# Patient Record
Sex: FEMALE
[De-identification: names, ages, dates, MRNs, and addresses within clinical notes are randomized per-mention and may not be internally consistent; named-entity substitution may affect disease eponyms.]

---

## 2017-03-15 ENCOUNTER — APPOINTMENT (OUTPATIENT)
Dept: UROLOGY | Facility: CLINIC | Age: 62
End: 2017-03-15

## 2017-03-15 VITALS
TEMPERATURE: 98.1 F | HEIGHT: 65 IN | SYSTOLIC BLOOD PRESSURE: 120 MMHG | HEART RATE: 79 BPM | DIASTOLIC BLOOD PRESSURE: 77 MMHG | WEIGHT: 148 LBS | BODY MASS INDEX: 24.66 KG/M2

## 2017-03-15 DIAGNOSIS — N81.84 PELVIC MUSCLE WASTING: ICD-10-CM

## 2017-03-15 DIAGNOSIS — Z00.00 ENCOUNTER FOR GENERAL ADULT MEDICAL EXAMINATION W/OUT ABNORMAL FINDINGS: ICD-10-CM

## 2017-03-16 ENCOUNTER — TRANSCRIPTION ENCOUNTER (OUTPATIENT)
Age: 62
End: 2017-03-16

## 2017-09-04 ENCOUNTER — TRANSCRIPTION ENCOUNTER (OUTPATIENT)
Age: 62
End: 2017-09-04

## 2021-05-18 ENCOUNTER — APPOINTMENT (OUTPATIENT)
Dept: UROLOGY | Facility: CLINIC | Age: 66
End: 2021-05-18

## 2023-03-16 ENCOUNTER — OFFICE (OUTPATIENT)
Dept: URBAN - METROPOLITAN AREA CLINIC 112 | Facility: CLINIC | Age: 68
Setting detail: OPHTHALMOLOGY
End: 2023-03-16
Payer: MEDICARE

## 2023-03-16 DIAGNOSIS — H04.122: ICD-10-CM

## 2023-03-16 DIAGNOSIS — H01.005: ICD-10-CM

## 2023-03-16 DIAGNOSIS — H25.13: ICD-10-CM

## 2023-03-16 DIAGNOSIS — H04.123: ICD-10-CM

## 2023-03-16 DIAGNOSIS — H04.121: ICD-10-CM

## 2023-03-16 DIAGNOSIS — H40.003: ICD-10-CM

## 2023-03-16 DIAGNOSIS — H01.002: ICD-10-CM

## 2023-03-16 PROCEDURE — 83861 MICROFLUID ANALY TEARS: CPT | Performed by: OPHTHALMOLOGY

## 2023-03-16 PROCEDURE — 92014 COMPRE OPH EXAM EST PT 1/>: CPT | Performed by: OPHTHALMOLOGY

## 2023-03-16 PROCEDURE — 92133 CPTRZD OPH DX IMG PST SGM ON: CPT | Performed by: OPHTHALMOLOGY

## 2023-03-16 ASSESSMENT — REFRACTION_CURRENTRX
OS_OVR_VA: 20/
OS_SPHERE: -1.50
OS_SPHERE: -2.25
OS_VPRISM_DIRECTION: PROGS
OD_SPHERE: -1.75
OS_AXIS: 140
OS_OVR_VA: 20/
OD_AXIS: 026
OD_CYLINDER: -0.50
OS_OVR_VA: 20/
OS_VPRISM_DIRECTION: PROGS
OD_VPRISM_DIRECTION: PROGS
OD_VPRISM_DIRECTION: PROGS
OD_AXIS: 22
OD_OVR_VA: 20/
OD_CYLINDER: -0.50
OS_ADD: +2.50
OD_VPRISM_DIRECTION: PROGS
OS_ADD: +1.50
OD_OVR_VA: 20/
OS_CYLINDER: -0.50
OD_SPHERE: -1.50
OD_ADD: +2.50
OD_SPHERE: -2.00
OD_OVR_VA: 20/
OS_ADD: +2.25
OD_CYLINDER: -0.50
OD_ADD: +1.50
OS_CYLINDER: -0.50
OS_AXIS: 154
OD_ADD: +2.25
OD_AXIS: 070
OS_VPRISM_DIRECTION: PROGS
OS_CYLINDER: -0.50
OS_AXIS: 152
OS_SPHERE: -2.25

## 2023-03-16 ASSESSMENT — KERATOMETRY
OS_AXISANGLE_DEGREES: 056
OD_AXISANGLE_DEGREES: 090
OD_K2POWER_DIOPTERS: 45.25
OS_K1POWER_DIOPTERS: 45.25
METHOD_AUTO_MANUAL: AUTO
OS_K2POWER_DIOPTERS: 45.50
OD_K1POWER_DIOPTERS: 45.25

## 2023-03-16 ASSESSMENT — REFRACTION_MANIFEST
OD_VA1: 20/20
OS_ADD: +2.50
OD_SPHERE: -2.25
OD_AXIS: 023
OD_AXIS: 070
OS_AXIS: 143
OS_AXIS: 140
OD_CYLINDER: -0.75
OS_SPHERE: -2.25
OS_VA1: 20/20
OS_ADD: +2.50
OU_VA: 20/20
OD_CYLINDER: -0.50
OS_AXIS: 147
OD_SPHERE: +2.25
OD_VA2: 20/20
OS_VA1: 20/20
OS_VA1: 20/20
OS_ADD: +2.50
OD_VA1: 20/20
OS_SPHERE: -1.75
OS_AXIS: 152
OS_CYLINDER: -0.50
OD_VA1: 20/20
OD_ADD: +2.50
OS_AXIS: 140
OD_AXIS: 020
OD_ADD: +2.50
OS_SPHERE: -2.25
OD_CYLINDER: -0.50
OS_SPHERE: -2.00
OD_CYLINDER: -0.50
OS_VA2: 20/20
OS_CYLINDER: -0.50
OS_CYLINDER: -0.75
OS_CYLINDER: -0.50
OS_VA1: 20/25
OD_SPHERE: -1.75
OD_ADD: +2.50
OD_CYLINDER: -0.50
OS_VA1: 20/20
OU_VA: 20/20
OD_VA1: 20/20
OD_SPHERE: -2.00
OD_VA1: 20/20
OS_AXIS: 150
OD_AXIS: 017
OD_AXIS: 025
OD_SPHERE: -1.75
OS_ADD: +2.50
OD_VA1: 20/20
OD_ADD: +2.50
OD_CYLINDER: -0.25
OD_SPHERE: -2.25
OS_CYLINDER: -0.50
OU_VA: 20/20
OD_AXIS: 022
OD_ADD: +2.50
OS_ADD: +2.50
OS_VA1: 20/25
OD_ADD: +2.50
OS_SPHERE: -1.75
OS_SPHERE: -1.50
OS_CYLINDER: -0.50
OS_ADD: +2.50

## 2023-03-16 ASSESSMENT — CONFRONTATIONAL VISUAL FIELD TEST (CVF)
OD_FINDINGS: FULL
OS_FINDINGS: FULL

## 2023-03-16 ASSESSMENT — PACHYMETRY
OS_CT_UM: 477
OD_CT_UM: 480
OS_CT_CORRECTION: 5
OD_CT_CORRECTION: 4

## 2023-03-16 ASSESSMENT — AXIALLENGTH_DERIVED
OS_AL: 23.7813
OD_AL: 23.68
OD_AL: 23.9278
OS_AL: 23.8807
OD_AL: 23.9278
OS_AL: 23.8807
OD_AL: 23.7785
OD_AL: 23.7785
OS_AL: 23.585
OD_AL: 22.2507
OS_AL: 23.6828
OD_AL: 23.8281
OS_AL: 23.7319
OS_AL: 23.7319

## 2023-03-16 ASSESSMENT — SPHEQUIV_DERIVED
OD_SPHEQUIV: -2.5
OS_SPHEQUIV: -2.5
OS_SPHEQUIV: -2.125
OS_SPHEQUIV: -2.5
OD_SPHEQUIV: -2.125
OD_SPHEQUIV: -2.25
OS_SPHEQUIV: -2.25
OS_SPHEQUIV: -2
OS_SPHEQUIV: -2.125
OD_SPHEQUIV: -1.875
OD_SPHEQUIV: -2.5
OS_SPHEQUIV: -1.75
OD_SPHEQUIV: -2.125
OD_SPHEQUIV: 2

## 2023-03-16 ASSESSMENT — LID EXAM ASSESSMENTS
OD_BLEPHARITIS: RLL T
OS_TRICHIASIS: ABSENT
OS_BLEPHARITIS: LLL T
OD_COMMENTS: WNL

## 2023-03-16 ASSESSMENT — SUPERFICIAL PUNCTATE KERATITIS (SPK)
OS_SPK: T
OD_SPK: T

## 2023-03-16 ASSESSMENT — REFRACTION_AUTOREFRACTION
OD_CYLINDER: -0.75
OS_SPHERE: -1.75
OD_SPHERE: -1.75
OD_AXIS: 051
OS_AXIS: 137
OS_CYLINDER: -0.75

## 2023-03-16 ASSESSMENT — TONOMETRY
OD_IOP_MMHG: 12
OS_IOP_MMHG: 12

## 2023-03-16 ASSESSMENT — VISUAL ACUITY
OD_BCVA: 20/25
OS_BCVA: 20/20

## 2023-09-13 ENCOUNTER — OFFICE (OUTPATIENT)
Dept: URBAN - METROPOLITAN AREA CLINIC 112 | Facility: CLINIC | Age: 68
Setting detail: OPHTHALMOLOGY
End: 2023-09-13
Payer: MEDICARE

## 2023-09-13 DIAGNOSIS — H01.002: ICD-10-CM

## 2023-09-13 DIAGNOSIS — H04.122: ICD-10-CM

## 2023-09-13 DIAGNOSIS — H25.13: ICD-10-CM

## 2023-09-13 DIAGNOSIS — H01.005: ICD-10-CM

## 2023-09-13 DIAGNOSIS — H40.003: ICD-10-CM

## 2023-09-13 DIAGNOSIS — H04.123: ICD-10-CM

## 2023-09-13 DIAGNOSIS — H04.121: ICD-10-CM

## 2023-09-13 PROCEDURE — 92083 EXTENDED VISUAL FIELD XM: CPT | Performed by: OPHTHALMOLOGY

## 2023-09-13 PROCEDURE — 92014 COMPRE OPH EXAM EST PT 1/>: CPT | Performed by: OPHTHALMOLOGY

## 2023-09-13 PROCEDURE — 92250 FUNDUS PHOTOGRAPHY W/I&R: CPT | Performed by: OPHTHALMOLOGY

## 2023-09-13 ASSESSMENT — REFRACTION_CURRENTRX
OS_ADD: +1.50
OD_CYLINDER: -0.50
OD_OVR_VA: 20/
OD_VPRISM_DIRECTION: PROGS
OS_VPRISM_DIRECTION: PROGS
OS_AXIS: 140
OD_SPHERE: -1.75
OD_AXIS: 026
OS_ADD: +2.25
OS_CYLINDER: -0.50
OD_OVR_VA: 20/
OD_VPRISM_DIRECTION: PROGS
OS_VPRISM_DIRECTION: PROGS
OD_VPRISM_DIRECTION: PROGS
OD_ADD: +2.25
OS_OVR_VA: 20/
OS_AXIS: 154
OD_SPHERE: -2.00
OS_OVR_VA: 20/
OS_CYLINDER: -0.50
OS_SPHERE: -2.25
OS_SPHERE: -1.50
OD_ADD: +1.50
OS_CYLINDER: -0.50
OD_AXIS: 070
OS_VPRISM_DIRECTION: PROGS
OS_ADD: +2.50
OS_AXIS: 152
OD_CYLINDER: -0.50
OD_CYLINDER: -0.50
OD_ADD: +2.50
OD_OVR_VA: 20/
OS_OVR_VA: 20/
OD_AXIS: 22
OD_SPHERE: -1.50
OS_SPHERE: -2.25

## 2023-09-13 ASSESSMENT — REFRACTION_MANIFEST
OD_AXIS: 017
OS_AXIS: 152
OD_ADD: +2.50
OD_VA1: 20/20
OS_ADD: +2.50
OS_SPHERE: -2.25
OS_SPHERE: -1.50
OU_VA: 20/20
OS_VA1: 20/20
OS_VA1: 20/20
OD_SPHERE: -1.75
OS_ADD: +2.50
OD_CYLINDER: -0.50
OD_VA1: 20/20
OS_CYLINDER: -0.50
OD_CYLINDER: -0.75
OD_AXIS: 070
OD_SPHERE: -1.75
OS_CYLINDER: -0.50
OD_VA1: 20/20
OS_AXIS: 140
OS_ADD: +2.50
OS_VA1: 20/20
OD_AXIS: 022
OD_SPHERE: -2.25
OS_CYLINDER: -0.50
OD_SPHERE: -2.25
OD_ADD: +2.50
OS_AXIS: 140
OD_SPHERE: +2.25
OD_ADD: +2.50
OS_AXIS: 147
OS_CYLINDER: -0.50
OD_ADD: +2.50
OS_VA1: 20/25
OS_VA1: 20/25
OS_AXIS: 150
OS_CYLINDER: -0.50
OU_VA: 20/20
OS_ADD: +2.50
OU_VA: 20/20
OS_VA2: 20/20
OS_ADD: +2.50
OD_SPHERE: -2.00
OS_SPHERE: -1.75
OD_VA1: 20/20
OS_VA1: 20/20
OD_AXIS: 025
OD_CYLINDER: -0.50
OD_AXIS: 020
OD_VA1: 20/20
OD_VA2: 20/20
OS_ADD: +2.50
OS_AXIS: 143
OS_SPHERE: -1.75
OD_ADD: +2.50
OD_CYLINDER: -0.50
OS_CYLINDER: -0.75
OS_SPHERE: -2.25
OD_CYLINDER: -0.25
OD_CYLINDER: -0.50
OD_VA1: 20/20
OS_SPHERE: -2.00
OD_ADD: +2.50
OD_AXIS: 023

## 2023-09-13 ASSESSMENT — SPHEQUIV_DERIVED
OD_SPHEQUIV: -2.5
OD_SPHEQUIV: -2.25
OS_SPHEQUIV: -2.5
OD_SPHEQUIV: -1.875
OS_SPHEQUIV: -1.75
OS_SPHEQUIV: -1.875
OD_SPHEQUIV: -2.125
OD_SPHEQUIV: -2.5
OD_SPHEQUIV: -2.25
OS_SPHEQUIV: -2.25
OS_SPHEQUIV: -2
OS_SPHEQUIV: -2.5
OS_SPHEQUIV: -2.125
OD_SPHEQUIV: 2

## 2023-09-13 ASSESSMENT — KERATOMETRY
METHOD_AUTO_MANUAL: AUTO
OD_AXISANGLE_DEGREES: 090
OS_AXISANGLE_DEGREES: 056
OS_K2POWER_DIOPTERS: 45.50
OS_K1POWER_DIOPTERS: 45.25
OD_K2POWER_DIOPTERS: 45.25
OD_K1POWER_DIOPTERS: 45.25

## 2023-09-13 ASSESSMENT — TONOMETRY
OS_IOP_MMHG: 13
OD_IOP_MMHG: 16

## 2023-09-13 ASSESSMENT — LID EXAM ASSESSMENTS
OS_BLEPHARITIS: LLL T
OD_BLEPHARITIS: RLL T
OS_TRICHIASIS: ABSENT
OD_COMMENTS: WNL

## 2023-09-13 ASSESSMENT — REFRACTION_AUTOREFRACTION
OD_CYLINDER: -0.50
OS_SPHERE: -1.75
OD_SPHERE: -2.00
OS_AXIS: 140
OS_CYLINDER: -0.25
OD_AXIS: 051

## 2023-09-13 ASSESSMENT — CONFRONTATIONAL VISUAL FIELD TEST (CVF)
OS_FINDINGS: FULL
OD_FINDINGS: FULL

## 2023-09-13 ASSESSMENT — SUPERFICIAL PUNCTATE KERATITIS (SPK)
OS_SPK: T
OD_SPK: T

## 2023-09-13 ASSESSMENT — PACHYMETRY
OD_CT_UM: 480
OS_CT_CORRECTION: 5
OD_CT_CORRECTION: 4
OS_CT_UM: 477

## 2023-09-13 ASSESSMENT — VISUAL ACUITY
OS_BCVA: 20/20
OD_BCVA: 20/25+1

## 2023-09-13 ASSESSMENT — AXIALLENGTH_DERIVED
OD_AL: 23.7785
OD_AL: 23.9278
OS_AL: 23.7319
OD_AL: 23.68
OS_AL: 23.6828
OS_AL: 23.8807
OS_AL: 23.8807
OD_AL: 23.8281
OD_AL: 23.8281
OS_AL: 23.585
OD_AL: 22.2507
OS_AL: 23.6338
OD_AL: 23.9278
OS_AL: 23.7813

## 2023-11-08 ENCOUNTER — OFFICE (OUTPATIENT)
Dept: URBAN - METROPOLITAN AREA CLINIC 116 | Facility: CLINIC | Age: 68
Setting detail: OPHTHALMOLOGY
End: 2023-11-08
Payer: MEDICARE

## 2023-11-08 DIAGNOSIS — H02.825: ICD-10-CM

## 2023-11-08 PROCEDURE — 92014 COMPRE OPH EXAM EST PT 1/>: CPT | Performed by: OPTOMETRIST

## 2023-11-08 ASSESSMENT — REFRACTION_CURRENTRX
OS_ADD: +1.50
OS_AXIS: 145
OS_OVR_VA: 20/
OS_CYLINDER: -0.50
OD_OVR_VA: 20/
OS_OVR_VA: 20/
OD_ADD: +2.50
OS_SPHERE: -2.25
OD_CYLINDER: -0.50
OD_AXIS: 055
OS_VPRISM_DIRECTION: PROGS
OS_ADD: +2.25
OD_OVR_VA: 20/
OD_CYLINDER: -0.50
OS_VPRISM_DIRECTION: PROGS
OD_SPHERE: -2.00
OD_SPHERE: -1.75
OS_AXIS: 152
OD_OVR_VA: 20/
OD_SPHERE: -1.50
OD_VPRISM_DIRECTION: PROGS
OD_AXIS: 026
OS_SPHERE: -1.50
OS_AXIS: 154
OD_AXIS: 22
OS_CYLINDER: -0.50
OS_ADD: +2.50
OD_ADD: +2.25
OD_ADD: +1.50
OD_AXIS: 070
OD_VPRISM_DIRECTION: PROGS
OD_ADD: +2.25
OS_AXIS: 140
OS_ADD: +2.25
OD_CYLINDER: -0.50
OS_CYLINDER: -0.50
OS_OVR_VA: 20/
OS_VPRISM_DIRECTION: PROGS
OS_CYLINDER: -0.50
OD_CYLINDER: -0.25
OS_SPHERE: -2.25
OD_SPHERE: -1.75
OD_VPRISM_DIRECTION: PROGS
OS_SPHERE: -1.50

## 2023-11-08 ASSESSMENT — REFRACTION_MANIFEST
OD_AXIS: 022
OD_ADD: +2.50
OD_CYLINDER: -0.25
OD_AXIS: 025
OD_SPHERE: +2.25
OD_CYLINDER: -0.50
OS_VA1: 20/25
OS_CYLINDER: -0.50
OS_ADD: +2.50
OS_CYLINDER: -0.50
OD_SPHERE: -1.75
OS_SPHERE: -1.75
OD_VA1: 20/20
OD_VA2: 20/20
OS_ADD: +2.50
OS_ADD: +2.50
OS_VA1: 20/20
OD_ADD: +2.50
OS_VA1: 20/20
OD_VA1: 20/20
OS_AXIS: 150
OU_VA: 20/20
OD_ADD: +2.25
OD_AXIS: 017
OD_SPHERE: -1.75
OS_VA1: 20/20
OU_VA: 20/20
OS_CYLINDER: -0.50
OD_CYLINDER: -0.50
OD_VA1: 20/20
OS_VA1: 20/20
OU_VA: 20/20
OD_AXIS: 023
OS_ADD: +2.50
OD_AXIS: 075
OS_CYLINDER: -0.75
OD_VA1: 20/20
OD_AXIS: 020
OS_ADD: +2.25
OS_SPHERE: -1.75
OD_CYLINDER: -0.50
OS_AXIS: 147
OD_VA1: 20/20
OU_VA: 20/20
OD_CYLINDER: -0.75
OD_VA1: 20/20
OS_SPHERE: -1.75
OD_ADD: +2.50
OD_SPHERE: -1.75
OS_SPHERE: -1.50
OD_ADD: +2.50
OS_AXIS: 143
OS_VA1: 20/20
OD_ADD: +2.50
OD_SPHERE: -2.25
OD_ADD: +2.50
OS_CYLINDER: -0.50
OD_SPHERE: -2.00
OS_CYLINDER: -0.50
OD_CYLINDER: -0.50
OS_ADD: +2.50
OS_AXIS: 120
OD_SPHERE: -2.25
OS_SPHERE: -2.00
OD_AXIS: 070
OS_SPHERE: -2.25
OS_AXIS: 140
OS_AXIS: 152
OS_AXIS: 140
OS_VA2: 20/20
OS_CYLINDER: -0.25
OS_ADD: +2.50
OS_VA1: 20/25
OD_CYLINDER: -0.50
OS_SPHERE: -2.25
OD_VA1: 20/20

## 2023-11-08 ASSESSMENT — SPHEQUIV_DERIVED
OD_SPHEQUIV: -2.5
OD_SPHEQUIV: -2
OD_SPHEQUIV: -2.5
OD_SPHEQUIV: -2.25
OS_SPHEQUIV: -1.75
OS_SPHEQUIV: -2.25
OD_SPHEQUIV: -2
OS_SPHEQUIV: -1.875
OS_SPHEQUIV: -2.125
OS_SPHEQUIV: -2
OD_SPHEQUIV: 2
OD_SPHEQUIV: -2.125
OS_SPHEQUIV: -2.125
OS_SPHEQUIV: -2.5
OD_SPHEQUIV: -1.875
OS_SPHEQUIV: -2.5

## 2023-11-08 ASSESSMENT — REFRACTION_AUTOREFRACTION
OS_AXIS: 120
OD_CYLINDER: -0.50
OS_SPHERE: -2.00
OS_CYLINDER: -0.25
OD_AXIS: 085
OD_SPHERE: -1.75

## 2023-11-08 ASSESSMENT — CONFRONTATIONAL VISUAL FIELD TEST (CVF)
OD_FINDINGS: FULL
OS_FINDINGS: FULL

## 2023-11-08 ASSESSMENT — SUPERFICIAL PUNCTATE KERATITIS (SPK)
OD_SPK: T
OS_SPK: T

## 2023-11-08 ASSESSMENT — LID EXAM ASSESSMENTS
OS_TRICHIASIS: ABSENT
OS_BLEPHARITIS: LLL T
OD_BLEPHARITIS: RLL T
OD_COMMENTS: WNL

## 2024-09-09 ENCOUNTER — APPOINTMENT (OUTPATIENT)
Dept: UROGYNECOLOGY | Facility: CLINIC | Age: 69
End: 2024-09-09
Payer: MEDICARE

## 2024-09-09 VITALS
DIASTOLIC BLOOD PRESSURE: 80 MMHG | SYSTOLIC BLOOD PRESSURE: 134 MMHG | WEIGHT: 155 LBS | BODY MASS INDEX: 25.83 KG/M2 | HEIGHT: 65 IN

## 2024-09-09 DIAGNOSIS — R35.0 FREQUENCY OF MICTURITION: ICD-10-CM

## 2024-09-09 LAB
BILIRUB UR QL STRIP: NEGATIVE
CLARITY UR: CLEAR
COLLECTION METHOD: NORMAL
GLUCOSE UR-MCNC: NEGATIVE
HCG UR QL: 1 EU/DL
HGB UR QL STRIP.AUTO: NEGATIVE
KETONES UR-MCNC: NEGATIVE
LEUKOCYTE ESTERASE UR QL STRIP: NEGATIVE
NITRITE UR QL STRIP: NEGATIVE
PH UR STRIP: 7.5
PROT UR STRIP-MCNC: NEGATIVE
SP GR UR STRIP: 1.02

## 2024-09-09 PROCEDURE — 81003 URINALYSIS AUTO W/O SCOPE: CPT | Mod: QW

## 2024-09-09 PROCEDURE — 99459 PELVIC EXAMINATION: CPT

## 2024-09-09 PROCEDURE — 51798 US URINE CAPACITY MEASURE: CPT

## 2024-09-09 PROCEDURE — 99204 OFFICE O/P NEW MOD 45 MIN: CPT
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
## 2024-09-09 NOTE — DISCUSSION/SUMMARY
[FreeTextEntry1] : MING is a 68 year female who presents for PFD. On exam, negative CST, normal PVR, no POP. Doing well with current regimen.   Based on her exams and symptoms we discussed pelvic floor dysfunction with significant levator spasm. We discussed management options including relaxation techniques including stress reduction, avoiding pushing and straining, aveeno oatmeal baths 15 minutes twice daily, and management of constipation. We discussed the importance of physical therapy. We discussed risks and benefits of vaginal valium, she is aware she needs to go to a compounding pharmacy.   [] Continue PF PT referral given [] Continue valium suppositories PRN [] Continue Estradiol cream  [] Pelvic sono ordered   f/u Jenny Samayoa All questions answered.

## 2024-09-09 NOTE — ADDENDUM
[FreeTextEntry1] : This note was written by Nenita Gary, acting as the  for Dr. Lazcano. This note accurately reflects the work and decisions made by Dr. Lazcano.

## 2024-09-09 NOTE — PHYSICAL EXAM
[Chaperone Present] : A chaperone was present in the examining room during all aspects of the physical examination [70057] : A chaperone was present during the pelvic exam. [No Acute Distress] : in no acute distress [Well developed] : well developed [Well Nourished] : ~L well nourished [Oriented x3] : oriented to person, place, and time [No Edema] : ~T edema was not present [Warm and Dry] : was warm and dry to touch [Vulvar Atrophy] : vulvar atrophy [Labia Majora] : were normal [Labia Minora] : were normal [Normal Appearance] : general appearance was normal [Atrophy] : atrophy [Normal] : normal [Soft] :  the cervix was soft [Uterine Adnexae] : were not tender and not enlarged [FreeTextEntry2] :  Nenita Gary [Cough] : no cough [Tenderness] : ~T no ~M abdominal tenderness observed [Distended] : not distended [FreeTextEntry3] : neg CST [de-identified] : no POP

## 2024-09-09 NOTE — PHYSICAL EXAM
[Chaperone Present] : A chaperone was present in the examining room during all aspects of the physical examination [28615] : A chaperone was present during the pelvic exam. [No Acute Distress] : in no acute distress [Well developed] : well developed [Well Nourished] : ~L well nourished [Oriented x3] : oriented to person, place, and time [No Edema] : ~T edema was not present [Warm and Dry] : was warm and dry to touch [Vulvar Atrophy] : vulvar atrophy [Labia Majora] : were normal [Labia Minora] : were normal [Normal Appearance] : general appearance was normal [Atrophy] : atrophy [Normal] : normal [Soft] :  the cervix was soft [Uterine Adnexae] : were not tender and not enlarged [FreeTextEntry2] :  Nenita Gary [Cough] : no cough [Tenderness] : ~T no ~M abdominal tenderness observed [Distended] : not distended [FreeTextEntry3] : neg CST [de-identified] : no POP

## 2024-09-09 NOTE — REASON FOR VISIT
[Initial Visit ___] : an initial visit for [unfilled] [Questionnaire Received] : Patient questionnaire received [Intake Form Reviewed] : Patient intake form with past medical history, surgical history, family history and social history reviewed today [Pelvic Organ Prolapse] : pelvic organ prolapse [Pelvic Pain] : pelvic pain

## 2024-09-09 NOTE — PROCEDURE
[Straight Catheterization] : insertion of a straight catheter [Urinary Frequency] : urinary frequency [Patient] : the patient [___ Fr Straight Tip] : a [unfilled] in [place] straight tip catheter [None] : there were no complications with the catheter insertion [Clear] : clear [No Complications] : no complications [Tolerated Well] : the patient tolerated the procedure well [Post procedure instructions and information given] : Post procedure instructions and information were given and reviewed with patient. [0] : 0

## 2024-09-09 NOTE — OB HISTORY
[Vaginal ___] : [unfilled] vaginal delivery(s) [Definite ___ (Date)] : the last menstrual period was [unfilled] [Taking Estrogens] : taking estrogen replacement [Abnormal Pap Smear] : normal pap smear [Sexually Active] : is not sexually active [FreeTextEntry1] : Largest baby 7lbs 7oz

## 2024-09-09 NOTE — PROCEDURE
[Straight Catheterization] : insertion of a straight catheter [Urinary Frequency] : urinary frequency [Patient] : the patient [___ Fr Straight Tip] : a [unfilled] in [place] straight tip catheter [None] : there were no complications with the catheter insertion [Clear] : clear [No Complications] : no complications [Tolerated Well] : the patient tolerated the procedure well [Post procedure instructions and information given] : Post procedure instructions and information were given and reviewed with patient. [0] : 0

## 2024-09-12 PROBLEM — R35.0 FREQUENT URINATION: Status: ACTIVE | Noted: 2024-09-12

## 2024-09-12 RX ORDER — DIAZEPAM 2 MG/1
2 TABLET ORAL
Refills: 0 | Status: ACTIVE | COMMUNITY

## 2024-09-12 RX ORDER — ESTRADIOL 1 MG/1
1 TABLET ORAL
Refills: 0 | Status: ACTIVE | COMMUNITY

## 2024-09-23 ENCOUNTER — NON-APPOINTMENT (OUTPATIENT)
Age: 69
End: 2024-09-23

## 2024-09-23 ENCOUNTER — OUTPATIENT (OUTPATIENT)
Dept: OUTPATIENT SERVICES | Facility: HOSPITAL | Age: 69
LOS: 1 days | End: 2024-09-23
Payer: MEDICARE

## 2024-09-23 ENCOUNTER — APPOINTMENT (OUTPATIENT)
Dept: ULTRASOUND IMAGING | Facility: CLINIC | Age: 69
End: 2024-09-23
Payer: MEDICARE

## 2024-09-23 DIAGNOSIS — M62.89 OTHER SPECIFIED DISORDERS OF MUSCLE: ICD-10-CM

## 2024-09-23 DIAGNOSIS — R39.15 URGENCY OF URINATION: ICD-10-CM

## 2024-09-23 DIAGNOSIS — R39.89 OTHER SYMPTOMS AND SIGNS INVOLVING THE GENITOURINARY SYSTEM: ICD-10-CM

## 2024-09-23 PROCEDURE — 76830 TRANSVAGINAL US NON-OB: CPT

## 2024-09-23 PROCEDURE — 76856 US EXAM PELVIC COMPLETE: CPT | Mod: 26

## 2024-09-23 PROCEDURE — 76856 US EXAM PELVIC COMPLETE: CPT

## 2024-09-23 PROCEDURE — 76830 TRANSVAGINAL US NON-OB: CPT | Mod: 26

## 2024-09-25 ENCOUNTER — OFFICE (OUTPATIENT)
Dept: URBAN - METROPOLITAN AREA CLINIC 112 | Facility: CLINIC | Age: 69
Setting detail: OPHTHALMOLOGY
End: 2024-09-25
Payer: MEDICARE

## 2024-09-25 DIAGNOSIS — H04.121: ICD-10-CM

## 2024-09-25 DIAGNOSIS — H01.005: ICD-10-CM

## 2024-09-25 DIAGNOSIS — H40.003: ICD-10-CM

## 2024-09-25 DIAGNOSIS — H01.002: ICD-10-CM

## 2024-09-25 DIAGNOSIS — H04.122: ICD-10-CM

## 2024-09-25 DIAGNOSIS — H25.13: ICD-10-CM

## 2024-09-25 DIAGNOSIS — H04.123: ICD-10-CM

## 2024-09-25 PROCEDURE — 92250 FUNDUS PHOTOGRAPHY W/I&R: CPT | Performed by: OPHTHALMOLOGY

## 2024-09-25 PROCEDURE — 92083 EXTENDED VISUAL FIELD XM: CPT | Performed by: OPHTHALMOLOGY

## 2024-09-25 PROCEDURE — 83861 MICROFLUID ANALY TEARS: CPT | Mod: QW,LT | Performed by: OPHTHALMOLOGY

## 2024-09-25 PROCEDURE — 83861 MICROFLUID ANALY TEARS: CPT | Mod: QW,RT | Performed by: OPHTHALMOLOGY

## 2024-09-25 PROCEDURE — 92014 COMPRE OPH EXAM EST PT 1/>: CPT | Performed by: OPHTHALMOLOGY

## 2024-09-25 ASSESSMENT — CONFRONTATIONAL VISUAL FIELD TEST (CVF)
OD_FINDINGS: FULL
OS_FINDINGS: FULL

## 2024-09-25 ASSESSMENT — LID EXAM ASSESSMENTS
OS_TRICHIASIS: ABSENT
OS_BLEPHARITIS: LLL T
OD_COMMENTS: WNL
OD_BLEPHARITIS: RLL T

## 2024-09-26 RX ORDER — DIAZEPAM 5 MG/1
5 TABLET ORAL
Qty: 30 | Refills: 0 | Status: ACTIVE | COMMUNITY
Start: 2024-09-26 | End: 1900-01-01

## 2024-09-27 RX ORDER — DIAZEPAM 5 MG/1
5 TABLET ORAL
Qty: 60 | Refills: 0 | Status: ACTIVE | COMMUNITY
Start: 2024-09-27 | End: 1900-01-01

## 2024-11-20 ENCOUNTER — LABORATORY RESULT (OUTPATIENT)
Age: 69
End: 2024-11-20

## 2024-11-20 ENCOUNTER — APPOINTMENT (OUTPATIENT)
Dept: UROGYNECOLOGY | Facility: CLINIC | Age: 69
End: 2024-11-20
Payer: MEDICARE

## 2024-11-20 VITALS
BODY MASS INDEX: 25.83 KG/M2 | OXYGEN SATURATION: 98 % | TEMPERATURE: 98.2 F | HEART RATE: 62 BPM | WEIGHT: 155 LBS | SYSTOLIC BLOOD PRESSURE: 121 MMHG | DIASTOLIC BLOOD PRESSURE: 74 MMHG | HEIGHT: 65 IN

## 2024-11-20 DIAGNOSIS — M62.89 OTHER SPECIFIED DISORDERS OF MUSCLE: ICD-10-CM

## 2024-11-20 DIAGNOSIS — R39.89 OTHER SYMPTOMS AND SIGNS INVOLVING THE GENITOURINARY SYSTEM: ICD-10-CM

## 2024-11-20 DIAGNOSIS — R35.0 FREQUENCY OF MICTURITION: ICD-10-CM

## 2024-11-20 DIAGNOSIS — M62.838 OTHER MUSCLE SPASM: ICD-10-CM

## 2024-11-20 DIAGNOSIS — R39.15 URGENCY OF URINATION: ICD-10-CM

## 2024-11-20 PROCEDURE — 99215 OFFICE O/P EST HI 40 MIN: CPT

## 2024-11-20 PROCEDURE — 51798 US URINE CAPACITY MEASURE: CPT

## 2024-11-20 PROCEDURE — 99459 PELVIC EXAMINATION: CPT

## 2024-11-20 RX ORDER — METHENAMINE, SODIUM PHOSPHATE, MONOBASIC, ANHYDROUS, PHENYL SALICYLATE, METHYLENE BLUE AND HYOSCYAMINE SULFATE 118; 40.8; 36; 10; .12 MG/1; MG/1; MG/1; MG/1; MG/1
118 CAPSULE ORAL
Qty: 60 | Refills: 2 | Status: ACTIVE | COMMUNITY
Start: 2024-11-20 | End: 1900-01-01

## 2024-11-20 RX ORDER — BACLOFEN 20 MG/1
20 TABLET ORAL
Qty: 60 | Refills: 0 | Status: ACTIVE | COMMUNITY
Start: 2024-11-20 | End: 1900-01-01

## 2024-11-21 ENCOUNTER — NON-APPOINTMENT (OUTPATIENT)
Age: 69
End: 2024-11-21

## 2024-11-21 LAB
APPEARANCE: CLEAR
BILIRUBIN URINE: NEGATIVE
BLOOD URINE: NEGATIVE
COLOR: YELLOW
GLUCOSE QUALITATIVE U: NEGATIVE MG/DL
KETONES URINE: NEGATIVE MG/DL
LEUKOCYTE ESTERASE URINE: ABNORMAL
NITRITE URINE: NEGATIVE
PH URINE: 7.5
PROTEIN URINE: NEGATIVE MG/DL
SPECIFIC GRAVITY URINE: 1.01
UROBILINOGEN URINE: 0.2 MG/DL

## 2025-01-10 ENCOUNTER — APPOINTMENT (OUTPATIENT)
Dept: UROGYNECOLOGY | Facility: CLINIC | Age: 70
End: 2025-01-10
Payer: MEDICARE

## 2025-01-10 VITALS — HEART RATE: 84 BPM | TEMPERATURE: 98.1 F | SYSTOLIC BLOOD PRESSURE: 121 MMHG | DIASTOLIC BLOOD PRESSURE: 78 MMHG

## 2025-01-10 DIAGNOSIS — M62.89 OTHER SPECIFIED DISORDERS OF MUSCLE: ICD-10-CM

## 2025-01-10 DIAGNOSIS — R35.0 FREQUENCY OF MICTURITION: ICD-10-CM

## 2025-01-10 DIAGNOSIS — R39.89 OTHER SYMPTOMS AND SIGNS INVOLVING THE GENITOURINARY SYSTEM: ICD-10-CM

## 2025-01-10 PROCEDURE — 99213 OFFICE O/P EST LOW 20 MIN: CPT

## 2025-02-28 ENCOUNTER — APPOINTMENT (OUTPATIENT)
Dept: CARDIOLOGY | Facility: CLINIC | Age: 70
End: 2025-02-28

## 2025-03-26 ENCOUNTER — APPOINTMENT (OUTPATIENT)
Dept: UROGYNECOLOGY | Facility: CLINIC | Age: 70
End: 2025-03-26
Payer: MEDICARE

## 2025-03-26 ENCOUNTER — LABORATORY RESULT (OUTPATIENT)
Age: 70
End: 2025-03-26

## 2025-03-26 VITALS
HEART RATE: 65 BPM | HEIGHT: 66 IN | TEMPERATURE: 97.5 F | RESPIRATION RATE: 16 BRPM | DIASTOLIC BLOOD PRESSURE: 85 MMHG | WEIGHT: 150 LBS | SYSTOLIC BLOOD PRESSURE: 143 MMHG | BODY MASS INDEX: 24.11 KG/M2 | OXYGEN SATURATION: 99 %

## 2025-03-26 DIAGNOSIS — R39.89 OTHER SYMPTOMS AND SIGNS INVOLVING THE GENITOURINARY SYSTEM: ICD-10-CM

## 2025-03-26 DIAGNOSIS — N95.8 OTHER SPECIFIED MENOPAUSAL AND PERIMENOPAUSAL DISORDERS: ICD-10-CM

## 2025-03-26 DIAGNOSIS — R39.15 URGENCY OF URINATION: ICD-10-CM

## 2025-03-26 DIAGNOSIS — M62.89 OTHER SPECIFIED DISORDERS OF MUSCLE: ICD-10-CM

## 2025-03-26 DIAGNOSIS — R35.0 FREQUENCY OF MICTURITION: ICD-10-CM

## 2025-03-26 PROCEDURE — 99214 OFFICE O/P EST MOD 30 MIN: CPT

## 2025-03-26 PROCEDURE — 51798 US URINE CAPACITY MEASURE: CPT

## 2025-03-26 RX ORDER — ESTRADIOL 0.1 MG/G
0.1 CREAM VAGINAL
Qty: 1 | Refills: 0 | Status: ACTIVE | COMMUNITY
Start: 2025-03-26 | End: 1900-01-01

## 2025-04-01 ENCOUNTER — NON-APPOINTMENT (OUTPATIENT)
Age: 70
End: 2025-04-01

## 2025-05-23 ENCOUNTER — APPOINTMENT (OUTPATIENT)
Dept: CARDIOLOGY | Facility: CLINIC | Age: 70
End: 2025-05-23

## 2025-05-23 ENCOUNTER — NON-APPOINTMENT (OUTPATIENT)
Age: 70
End: 2025-05-23

## 2025-05-23 VITALS
HEART RATE: 80 BPM | BODY MASS INDEX: 25.55 KG/M2 | SYSTOLIC BLOOD PRESSURE: 144 MMHG | WEIGHT: 159 LBS | OXYGEN SATURATION: 99 % | HEIGHT: 66 IN | DIASTOLIC BLOOD PRESSURE: 82 MMHG

## 2025-05-23 VITALS — SYSTOLIC BLOOD PRESSURE: 136 MMHG | DIASTOLIC BLOOD PRESSURE: 86 MMHG

## 2025-05-23 DIAGNOSIS — Z82.49 FAMILY HISTORY OF ISCHEMIC HEART DISEASE AND OTHER DISEASES OF THE CIRCULATORY SYSTEM: ICD-10-CM

## 2025-05-23 DIAGNOSIS — Z80.9 FAMILY HISTORY OF MALIGNANT NEOPLASM, UNSPECIFIED: ICD-10-CM

## 2025-05-23 PROCEDURE — 99204 OFFICE O/P NEW MOD 45 MIN: CPT

## 2025-05-23 PROCEDURE — 93000 ELECTROCARDIOGRAM COMPLETE: CPT

## 2025-05-27 LAB
APPEARANCE: CLEAR
BACTERIA UR CULT: NORMAL
BACTERIA: NEGATIVE /HPF
BILIRUBIN URINE: NEGATIVE
BLOOD URINE: NEGATIVE
CAST: 0 /LPF
COLOR: ABNORMAL
EPITHELIAL CELLS: 3 /HPF
GLUCOSE QUALITATIVE U: NEGATIVE MG/DL
KETONES URINE: NEGATIVE MG/DL
LEUKOCYTE ESTERASE URINE: ABNORMAL
MICROSCOPIC-UA: NORMAL
NITRITE URINE: NEGATIVE
PH URINE: 6.5
PROTEIN URINE: NEGATIVE MG/DL
RED BLOOD CELLS URINE: 0 /HPF
SPECIFIC GRAVITY URINE: 1.01
UROBILINOGEN URINE: 0.2 MG/DL
WHITE BLOOD CELLS URINE: 2 /HPF

## 2025-05-28 ENCOUNTER — APPOINTMENT (OUTPATIENT)
Dept: UROGYNECOLOGY | Facility: CLINIC | Age: 70
End: 2025-05-28
Payer: MEDICARE

## 2025-05-28 ENCOUNTER — NON-APPOINTMENT (OUTPATIENT)
Age: 70
End: 2025-05-28

## 2025-05-28 VITALS — SYSTOLIC BLOOD PRESSURE: 128 MMHG | TEMPERATURE: 98 F | DIASTOLIC BLOOD PRESSURE: 80 MMHG | HEART RATE: 73 BPM

## 2025-05-28 DIAGNOSIS — R39.15 URGENCY OF URINATION: ICD-10-CM

## 2025-05-28 DIAGNOSIS — M62.89 OTHER SPECIFIED DISORDERS OF MUSCLE: ICD-10-CM

## 2025-05-28 DIAGNOSIS — N95.8 OTHER SPECIFIED MENOPAUSAL AND PERIMENOPAUSAL DISORDERS: ICD-10-CM

## 2025-05-28 DIAGNOSIS — M62.838 OTHER MUSCLE SPASM: ICD-10-CM

## 2025-05-28 PROCEDURE — 99214 OFFICE O/P EST MOD 30 MIN: CPT

## 2025-05-28 PROCEDURE — 51798 US URINE CAPACITY MEASURE: CPT

## 2025-05-28 RX ORDER — DIAZEPAM 100 %
POWDER (GRAM) MISCELLANEOUS
Qty: 60 | Refills: 0 | Status: ACTIVE | COMMUNITY
Start: 2025-05-28 | End: 1900-01-01

## 2025-07-11 ENCOUNTER — APPOINTMENT (OUTPATIENT)
Dept: UROGYNECOLOGY | Facility: CLINIC | Age: 70
End: 2025-07-11

## 2025-07-30 ENCOUNTER — APPOINTMENT (OUTPATIENT)
Dept: UROGYNECOLOGY | Facility: CLINIC | Age: 70
End: 2025-07-30
Payer: MEDICARE

## 2025-07-30 VITALS — SYSTOLIC BLOOD PRESSURE: 116 MMHG | HEART RATE: 81 BPM | DIASTOLIC BLOOD PRESSURE: 73 MMHG | TEMPERATURE: 97.9 F

## 2025-07-30 DIAGNOSIS — R39.89 OTHER SYMPTOMS AND SIGNS INVOLVING THE GENITOURINARY SYSTEM: ICD-10-CM

## 2025-07-30 DIAGNOSIS — R39.15 URGENCY OF URINATION: ICD-10-CM

## 2025-07-30 DIAGNOSIS — M62.89 OTHER SPECIFIED DISORDERS OF MUSCLE: ICD-10-CM

## 2025-07-30 DIAGNOSIS — N95.8 OTHER SPECIFIED MENOPAUSAL AND PERIMENOPAUSAL DISORDERS: ICD-10-CM

## 2025-07-30 PROCEDURE — 51798 US URINE CAPACITY MEASURE: CPT

## 2025-07-30 PROCEDURE — 99213 OFFICE O/P EST LOW 20 MIN: CPT

## 2025-08-01 RX ORDER — PHENAZOPYRIDINE HYDROCHLORIDE 200 MG/1
200 TABLET ORAL
Qty: 40 | Refills: 1 | Status: ACTIVE | COMMUNITY
Start: 2025-07-30 | End: 1900-01-01